# Patient Record
Sex: FEMALE | Race: WHITE | Employment: OTHER | URBAN - METROPOLITAN AREA
[De-identification: names, ages, dates, MRNs, and addresses within clinical notes are randomized per-mention and may not be internally consistent; named-entity substitution may affect disease eponyms.]

---

## 2019-02-19 ENCOUNTER — TRANSFERRED RECORDS (OUTPATIENT)
Dept: HEALTH INFORMATION MANAGEMENT | Facility: CLINIC | Age: 24
End: 2019-02-19

## 2020-10-24 ENCOUNTER — TRANSFERRED RECORDS (OUTPATIENT)
Dept: HEALTH INFORMATION MANAGEMENT | Facility: CLINIC | Age: 25
End: 2020-10-24

## 2021-04-06 ENCOUNTER — HOSPITAL ENCOUNTER (EMERGENCY)
Facility: CLINIC | Age: 26
Discharge: HOME | End: 2021-04-06
Attending: NURSE PRACTITIONER | Admitting: NURSE PRACTITIONER
Payer: MEDICARE

## 2021-04-06 VITALS
DIASTOLIC BLOOD PRESSURE: 60 MMHG | TEMPERATURE: 97.7 F | RESPIRATION RATE: 8 BRPM | SYSTOLIC BLOOD PRESSURE: 110 MMHG | WEIGHT: 110.1 LBS | HEART RATE: 64 BPM | OXYGEN SATURATION: 97 %

## 2021-04-06 DIAGNOSIS — R07.9 CHEST PAIN, UNSPECIFIED TYPE: ICD-10-CM

## 2021-04-06 DIAGNOSIS — R42 LIGHTHEADEDNESS: ICD-10-CM

## 2021-04-06 LAB
ANION GAP SERPL CALCULATED.3IONS-SCNC: 6 MMOL/L (ref 3–14)
BASOPHILS # BLD AUTO: 0 10E9/L (ref 0–0.2)
BASOPHILS NFR BLD AUTO: 0.7 %
BUN SERPL-MCNC: 8 MG/DL (ref 7–30)
CALCIUM SERPL-MCNC: 8.8 MG/DL (ref 8.5–10.1)
CHLORIDE SERPL-SCNC: 104 MMOL/L (ref 94–109)
CO2 SERPL-SCNC: 27 MMOL/L (ref 20–32)
CREAT SERPL-MCNC: 0.66 MG/DL (ref 0.52–1.04)
DIFFERENTIAL METHOD BLD: ABNORMAL
EOSINOPHIL # BLD AUTO: 0.1 10E9/L (ref 0–0.7)
EOSINOPHIL NFR BLD AUTO: 2 %
ERYTHROCYTE [DISTWIDTH] IN BLOOD BY AUTOMATED COUNT: 11.8 % (ref 10–15)
GFR SERPL CREATININE-BSD FRML MDRD: >90 ML/MIN/{1.73_M2}
GLUCOSE SERPL-MCNC: 102 MG/DL (ref 70–99)
HCT VFR BLD AUTO: 34.4 % (ref 35–47)
HGB BLD-MCNC: 12.2 G/DL (ref 11.7–15.7)
IMM GRANULOCYTES # BLD: 0 10E9/L (ref 0–0.4)
IMM GRANULOCYTES NFR BLD: 0.2 %
LYMPHOCYTES # BLD AUTO: 1.1 10E9/L (ref 0.8–5.3)
LYMPHOCYTES NFR BLD AUTO: 27.4 %
MAGNESIUM SERPL-MCNC: 2.2 MG/DL (ref 1.6–2.3)
MCH RBC QN AUTO: 33.8 PG (ref 26.5–33)
MCHC RBC AUTO-ENTMCNC: 35.5 G/DL (ref 31.5–36.5)
MCV RBC AUTO: 95 FL (ref 78–100)
MONOCYTES # BLD AUTO: 0.5 10E9/L (ref 0–1.3)
MONOCYTES NFR BLD AUTO: 12.9 %
NEUTROPHILS # BLD AUTO: 2.3 10E9/L (ref 1.6–8.3)
NEUTROPHILS NFR BLD AUTO: 56.8 %
NRBC # BLD AUTO: 0 10*3/UL
NRBC BLD AUTO-RTO: 0 /100
PHOSPHATE SERPL-MCNC: 3.6 MG/DL (ref 2.5–4.5)
PLATELET # BLD AUTO: 197 10E9/L (ref 150–450)
POTASSIUM SERPL-SCNC: 3.7 MMOL/L (ref 3.4–5.3)
RBC # BLD AUTO: 3.61 10E12/L (ref 3.8–5.2)
SODIUM SERPL-SCNC: 137 MMOL/L (ref 133–144)
TROPONIN I SERPL-MCNC: <0.015 UG/L (ref 0–0.04)
WBC # BLD AUTO: 4 10E9/L (ref 4–11)

## 2021-04-06 PROCEDURE — 93010 ELECTROCARDIOGRAM REPORT: CPT | Performed by: EMERGENCY MEDICINE

## 2021-04-06 PROCEDURE — 85025 COMPLETE CBC W/AUTO DIFF WBC: CPT | Performed by: EMERGENCY MEDICINE

## 2021-04-06 PROCEDURE — 80048 BASIC METABOLIC PNL TOTAL CA: CPT | Performed by: EMERGENCY MEDICINE

## 2021-04-06 PROCEDURE — 84484 ASSAY OF TROPONIN QUANT: CPT | Performed by: EMERGENCY MEDICINE

## 2021-04-06 PROCEDURE — 250N000013 HC RX MED GY IP 250 OP 250 PS 637: Performed by: EMERGENCY MEDICINE

## 2021-04-06 PROCEDURE — 83735 ASSAY OF MAGNESIUM: CPT | Performed by: EMERGENCY MEDICINE

## 2021-04-06 PROCEDURE — 99284 EMERGENCY DEPT VISIT MOD MDM: CPT | Performed by: EMERGENCY MEDICINE

## 2021-04-06 PROCEDURE — 93005 ELECTROCARDIOGRAM TRACING: CPT | Performed by: EMERGENCY MEDICINE

## 2021-04-06 PROCEDURE — 99284 EMERGENCY DEPT VISIT MOD MDM: CPT | Mod: 25 | Performed by: EMERGENCY MEDICINE

## 2021-04-06 PROCEDURE — 84100 ASSAY OF PHOSPHORUS: CPT | Performed by: EMERGENCY MEDICINE

## 2021-04-06 RX ORDER — CLONAZEPAM 0.5 MG/1
2 TABLET ORAL ONCE
Status: COMPLETED | OUTPATIENT
Start: 2021-04-06 | End: 2021-04-06

## 2021-04-06 RX ORDER — SACCHAROMYCES BOULARDII 250 MG
250 CAPSULE ORAL EVERY MORNING
COMMUNITY

## 2021-04-06 RX ORDER — POTASSIUM CHLORIDE 750 MG/1
10 CAPSULE, EXTENDED RELEASE ORAL AT BEDTIME
COMMUNITY
End: 2021-06-14

## 2021-04-06 RX ORDER — GABAPENTIN 600 MG/1
1200 TABLET ORAL AT BEDTIME
COMMUNITY

## 2021-04-06 RX ORDER — LACTOBACILLUS RHAMNOSUS GG 10B CELL
1 CAPSULE ORAL AT BEDTIME
COMMUNITY

## 2021-04-06 RX ORDER — CLONAZEPAM 1 MG/1
1 TABLET ORAL 2 TIMES DAILY PRN
COMMUNITY
End: 2021-06-14

## 2021-04-06 RX ORDER — GABAPENTIN 600 MG/1
600 TABLET ORAL 2 TIMES DAILY
COMMUNITY

## 2021-04-06 RX ORDER — LISDEXAMFETAMINE DIMESYLATE 40 MG/1
40 CAPSULE ORAL EVERY MORNING
COMMUNITY
End: 2021-06-14

## 2021-04-06 RX ORDER — MULTIVIT,TX WITH IRON,MINERALS
500 TABLET, EXTENDED RELEASE ORAL AT BEDTIME
COMMUNITY

## 2021-04-06 RX ORDER — PROPRANOLOL HYDROCHLORIDE 10 MG/1
10 TABLET ORAL 3 TIMES DAILY
COMMUNITY
End: 2021-06-14

## 2021-04-06 RX ORDER — PALIPERIDONE 3 MG/1
3 TABLET, EXTENDED RELEASE ORAL AT BEDTIME
COMMUNITY
End: 2021-06-14

## 2021-04-06 RX ADMIN — CLONAZEPAM 2 MG: 0.5 TABLET ORAL at 13:10

## 2021-04-06 NOTE — ED PROVIDER NOTES
History     Chief Complaint   Patient presents with     Chest Pain     HPI  Mellissa Mittal is a 25 year old female who presents to the emergency department secondary to not feeling well.  She has had some heart palpitations, lightheadedness, intermittent dyspnea.  She has a history of eating disorder, bulimia.  She has had problems with hypokalemia in the past secondary to her eating disorder.  Her eating disorder is currently under control.  Over the last year she stopped binging and purging.  She has had some residual GI issues with diarrhea occasional vomiting and GERD but she no longer self induces vomiting.  Each of her symptoms she is having now are acute on chronic.  Is just been worse over the last few days so she wondered if she had an electrolyte abnormality of some sort.  She does take potassium and magnesium supplementation.  She has not been persistently vomiting.  She vomited once yesterday.  She takes an antacid as well.  No new swelling in the legs.  No dysuria or hematuria.  No fever cough.    Allergies:  Allergies   Allergen Reactions     Tylenol [Acetaminophen] GI Disturbance       Problem List:    There are no active problems to display for this patient.       Past Medical History:    No past medical history on file.    Past Surgical History:    No past surgical history on file.    Family History:    No family history on file.    Social History:  Marital Status:    Social History     Tobacco Use     Smoking status: Not on file   Substance Use Topics     Alcohol use: Not on file     Drug use: Not on file        Medications:    clonazePAM (KLONOPIN) 1 MG tablet  esomeprazole (NEXIUM) 20 MG DR capsule  gabapentin (NEURONTIN) 600 MG tablet  gabapentin (NEURONTIN) 600 MG tablet  lactobacillus rhamnosus, GG, (CULTURELL) capsule  lisdexamfetamine (VYVANSE) 40 MG capsule  magnesium gluconate (MAGONATE) 250 MG tablet  paliperidone ER (INVEGA) 3 MG 24 hr tablet  potassium chloride ER (MICRO-K) 10  MEQ CR capsule  propranolol (INDERAL) 10 MG tablet  saccharomyces boulardii (FLORASTOR) 250 MG capsule  vitamin D3 (CHOLECALCIFEROL) 250 mcg (16965 units) capsule          Review of Systems   All other systems reviewed and are negative.      Physical Exam   BP: 121/81  Pulse: 64  Temp: 97.7  F (36.5  C)  Resp: 14  Weight: 49.9 kg (110 lb 1.6 oz)  SpO2: 98 %      Physical Exam  Vitals signs and nursing note reviewed.   Constitutional:       General: She is not in acute distress.     Appearance: She is well-developed. She is not diaphoretic.   HENT:      Head: Normocephalic and atraumatic.   Eyes:      General: No scleral icterus.  Neck:      Musculoskeletal: Normal range of motion and neck supple.   Cardiovascular:      Rate and Rhythm: Normal rate and regular rhythm.      Heart sounds: Normal heart sounds. Heart sounds not distant. No murmur. No systolic murmur. No diastolic murmur.   Pulmonary:      Effort: Pulmonary effort is normal. No accessory muscle usage or respiratory distress.      Breath sounds: Normal breath sounds. No decreased breath sounds, wheezing or rhonchi.   Musculoskeletal: Normal range of motion.      Right lower leg: No edema.   Skin:     General: Skin is warm and dry.      Coloration: Skin is not pale.      Findings: No erythema or rash.      Comments: Old scars on her arm that appear to be from previous self-induced cutting.   Neurological:      Mental Status: She is alert and oriented to person, place, and time.         ED Course        Procedures               EKG Interpretation:      Interpreted by Dirk Souza MD  Time reviewed: 1146  Symptoms at time of EKG: Chest pain  Rhythm: normal sinus   Rate: normal  Axis: normal  Ectopy: none  Conduction: normal  ST Segments/ T Waves: No ST-T wave changes  Q Waves: none  Comparison to prior: No old EKG available    Clinical Impression: normal EKG                 Results for orders placed or performed during the hospital encounter of 04/06/21  (from the past 24 hour(s))   CBC with platelets differential   Result Value Ref Range    WBC 4.0 4.0 - 11.0 10e9/L    RBC Count 3.61 (L) 3.8 - 5.2 10e12/L    Hemoglobin 12.2 11.7 - 15.7 g/dL    Hematocrit 34.4 (L) 35.0 - 47.0 %    MCV 95 78 - 100 fl    MCH 33.8 (H) 26.5 - 33.0 pg    MCHC 35.5 31.5 - 36.5 g/dL    RDW 11.8 10.0 - 15.0 %    Platelet Count 197 150 - 450 10e9/L    Diff Method Automated Method     % Neutrophils 56.8 %    % Lymphocytes 27.4 %    % Monocytes 12.9 %    % Eosinophils 2.0 %    % Basophils 0.7 %    % Immature Granulocytes 0.2 %    Nucleated RBCs 0 0 /100    Absolute Neutrophil 2.3 1.6 - 8.3 10e9/L    Absolute Lymphocytes 1.1 0.8 - 5.3 10e9/L    Absolute Monocytes 0.5 0.0 - 1.3 10e9/L    Absolute Eosinophils 0.1 0.0 - 0.7 10e9/L    Absolute Basophils 0.0 0.0 - 0.2 10e9/L    Abs Immature Granulocytes 0.0 0 - 0.4 10e9/L    Absolute Nucleated RBC 0.0    Basic metabolic panel   Result Value Ref Range    Sodium 137 133 - 144 mmol/L    Potassium 3.7 3.4 - 5.3 mmol/L    Chloride 104 94 - 109 mmol/L    Carbon Dioxide 27 20 - 32 mmol/L    Anion Gap 6 3 - 14 mmol/L    Glucose 102 (H) 70 - 99 mg/dL    Urea Nitrogen 8 7 - 30 mg/dL    Creatinine 0.66 0.52 - 1.04 mg/dL    GFR Estimate >90 >60 mL/min/[1.73_m2]    GFR Estimate If Black >90 >60 mL/min/[1.73_m2]    Calcium 8.8 8.5 - 10.1 mg/dL   Troponin I   Result Value Ref Range    Troponin I ES <0.015 0.000 - 0.045 ug/L   Magnesium   Result Value Ref Range    Magnesium 2.2 1.6 - 2.3 mg/dL   Phosphorus   Result Value Ref Range    Phosphorus 3.6 2.5 - 4.5 mg/dL       Medications   clonazePAM (klonoPIN) tablet 2 mg (2 mg Oral Given 4/6/21 8347)       Assessments & Plan (with Medical Decision Making)  25-year-old female with a history of bulimia presented with nonspecific symptoms of feeling lightheaded and intermittent chest discomfort.  She appeared somewhat anxious during the visit and we discussed the option to treat her anxiety with her clonazepam which she  wanted to do.  She improved and felt ready to go home.  Labs and EKG are reassuring.  No persistent shortness of breath or cough therefore chest x-ray not performed.  The differential diagnosis, treatment options, risks and follow-up discussed with a competent patient who agrees with the plan.     I have reviewed the nursing notes.    I have reviewed the findings, diagnosis, plan and need for follow up with the patient.      New Prescriptions    No medications on file       Final diagnoses:   Lightheadedness   Chest pain, unspecified type       4/6/2021   [facility] EMERGENCY DEPT     Dirk Souza MD  04/06/21 3235

## 2021-04-06 NOTE — ED TRIAGE NOTES
Pt presents with chest pain and heaviness. Pt with history of eating disorder. Pt states see's therapist for this. Pt is from [place] and now living with mom. Pt states K + is probably low.

## 2021-04-06 NOTE — DISCHARGE INSTRUCTIONS
As discussed I am not sure as to the cause of your lightheadedness and chest discomfort but your labs and EKG are reassuring.  Your phosphorus and magnesium were also normal.  I hope you continue to improve.  Please return to the emergency department if you develop new or worsening symptoms.  It was a pleasure to meet you today.

## 2021-06-14 ENCOUNTER — HOSPITAL ENCOUNTER (EMERGENCY)
Facility: CLINIC | Age: 26
Discharge: HOME | End: 2021-06-14
Attending: EMERGENCY MEDICINE | Admitting: EMERGENCY MEDICINE
Payer: MEDICARE

## 2021-06-14 VITALS
HEART RATE: 68 BPM | TEMPERATURE: 97.7 F | WEIGHT: 111 LBS | OXYGEN SATURATION: 98 % | SYSTOLIC BLOOD PRESSURE: 112 MMHG | DIASTOLIC BLOOD PRESSURE: 78 MMHG | RESPIRATION RATE: 16 BRPM

## 2021-06-14 DIAGNOSIS — N30.01 ACUTE CYSTITIS WITH HEMATURIA: ICD-10-CM

## 2021-06-14 LAB
ALBUMIN SERPL-MCNC: 4.1 G/DL (ref 3.4–5)
ALBUMIN UR-MCNC: NEGATIVE MG/DL
ALP SERPL-CCNC: 65 U/L (ref 40–150)
ALT SERPL W P-5'-P-CCNC: 21 U/L (ref 0–50)
ANION GAP SERPL CALCULATED.3IONS-SCNC: 4 MMOL/L (ref 3–14)
APPEARANCE UR: ABNORMAL
AST SERPL W P-5'-P-CCNC: 21 U/L (ref 0–45)
BACTERIA #/AREA URNS HPF: ABNORMAL /HPF
BASOPHILS # BLD AUTO: 0 10E9/L (ref 0–0.2)
BASOPHILS NFR BLD AUTO: 0.4 %
BILIRUB SERPL-MCNC: 0.6 MG/DL (ref 0.2–1.3)
BILIRUB UR QL STRIP: NEGATIVE
BUN SERPL-MCNC: 3 MG/DL (ref 7–30)
CALCIUM SERPL-MCNC: 8.6 MG/DL (ref 8.5–10.1)
CHLORIDE SERPL-SCNC: 99 MMOL/L (ref 94–109)
CO2 SERPL-SCNC: 29 MMOL/L (ref 20–32)
COLOR UR AUTO: YELLOW
CREAT SERPL-MCNC: 0.71 MG/DL (ref 0.52–1.04)
DIFFERENTIAL METHOD BLD: ABNORMAL
EOSINOPHIL # BLD AUTO: 0.1 10E9/L (ref 0–0.7)
EOSINOPHIL NFR BLD AUTO: 1.1 %
ERYTHROCYTE [DISTWIDTH] IN BLOOD BY AUTOMATED COUNT: 11.6 % (ref 10–15)
GFR SERPL CREATININE-BSD FRML MDRD: >90 ML/MIN/{1.73_M2}
GLUCOSE SERPL-MCNC: 84 MG/DL (ref 70–99)
GLUCOSE UR STRIP-MCNC: NEGATIVE MG/DL
HCT VFR BLD AUTO: 35.6 % (ref 35–47)
HGB BLD-MCNC: 13.1 G/DL (ref 11.7–15.7)
HGB UR QL STRIP: ABNORMAL
IMM GRANULOCYTES # BLD: 0 10E9/L (ref 0–0.4)
IMM GRANULOCYTES NFR BLD: 0.3 %
KETONES UR STRIP-MCNC: NEGATIVE MG/DL
LEUKOCYTE ESTERASE UR QL STRIP: ABNORMAL
LIPASE SERPL-CCNC: 139 U/L (ref 73–393)
LYMPHOCYTES # BLD AUTO: 1 10E9/L (ref 0.8–5.3)
LYMPHOCYTES NFR BLD AUTO: 12.7 %
MCH RBC QN AUTO: 33.9 PG (ref 26.5–33)
MCHC RBC AUTO-ENTMCNC: 36.8 G/DL (ref 31.5–36.5)
MCV RBC AUTO: 92 FL (ref 78–100)
MONOCYTES # BLD AUTO: 0.7 10E9/L (ref 0–1.3)
MONOCYTES NFR BLD AUTO: 9.6 %
NEUTROPHILS # BLD AUTO: 5.7 10E9/L (ref 1.6–8.3)
NEUTROPHILS NFR BLD AUTO: 75.9 %
NITRATE UR QL: NEGATIVE
NRBC # BLD AUTO: 0 10*3/UL
NRBC BLD AUTO-RTO: 0 /100
PH UR STRIP: 7 PH (ref 5–7)
PLATELET # BLD AUTO: 179 10E9/L (ref 150–450)
POTASSIUM SERPL-SCNC: 3.9 MMOL/L (ref 3.4–5.3)
PROT SERPL-MCNC: 7.5 G/DL (ref 6.8–8.8)
RBC # BLD AUTO: 3.86 10E12/L (ref 3.8–5.2)
RBC #/AREA URNS AUTO: 3 /HPF (ref 0–2)
SODIUM SERPL-SCNC: 132 MMOL/L (ref 133–144)
SOURCE: ABNORMAL
SP GR UR STRIP: 1 (ref 1–1.03)
SQUAMOUS #/AREA URNS AUTO: 2 /HPF (ref 0–1)
UROBILINOGEN UR STRIP-MCNC: 0 MG/DL (ref 0–2)
WBC # BLD AUTO: 7.5 10E9/L (ref 4–11)
WBC #/AREA URNS AUTO: 62 /HPF (ref 0–5)

## 2021-06-14 PROCEDURE — 99284 EMERGENCY DEPT VISIT MOD MDM: CPT | Mod: 25 | Performed by: EMERGENCY MEDICINE

## 2021-06-14 PROCEDURE — 80053 COMPREHEN METABOLIC PANEL: CPT | Performed by: EMERGENCY MEDICINE

## 2021-06-14 PROCEDURE — 87088 URINE BACTERIA CULTURE: CPT | Mod: 59 | Performed by: EMERGENCY MEDICINE

## 2021-06-14 PROCEDURE — 250N000011 HC RX IP 250 OP 636: Performed by: EMERGENCY MEDICINE

## 2021-06-14 PROCEDURE — 258N000003 HC RX IP 258 OP 636: Performed by: EMERGENCY MEDICINE

## 2021-06-14 PROCEDURE — 87186 SC STD MICRODIL/AGAR DIL: CPT | Mod: 59 | Performed by: EMERGENCY MEDICINE

## 2021-06-14 PROCEDURE — 96361 HYDRATE IV INFUSION ADD-ON: CPT | Performed by: EMERGENCY MEDICINE

## 2021-06-14 PROCEDURE — 99284 EMERGENCY DEPT VISIT MOD MDM: CPT | Performed by: EMERGENCY MEDICINE

## 2021-06-14 PROCEDURE — 81001 URINALYSIS AUTO W/SCOPE: CPT | Performed by: EMERGENCY MEDICINE

## 2021-06-14 PROCEDURE — 96374 THER/PROPH/DIAG INJ IV PUSH: CPT | Performed by: EMERGENCY MEDICINE

## 2021-06-14 PROCEDURE — 83690 ASSAY OF LIPASE: CPT | Performed by: EMERGENCY MEDICINE

## 2021-06-14 PROCEDURE — 85025 COMPLETE CBC W/AUTO DIFF WBC: CPT | Performed by: EMERGENCY MEDICINE

## 2021-06-14 PROCEDURE — 87086 URINE CULTURE/COLONY COUNT: CPT | Performed by: EMERGENCY MEDICINE

## 2021-06-14 RX ORDER — MAGNESIUM GLUCONATE 27 MG(500)
250 TABLET ORAL DAILY
COMMUNITY
End: 2021-06-14

## 2021-06-14 RX ORDER — IBUPROFEN 200 MG
600 TABLET ORAL EVERY 6 HOURS PRN
COMMUNITY

## 2021-06-14 RX ORDER — POTASSIUM CHLORIDE 750 MG/1
10 TABLET, EXTENDED RELEASE ORAL DAILY
COMMUNITY
Start: 2021-05-21

## 2021-06-14 RX ORDER — NITROFURANTOIN 25; 75 MG/1; MG/1
100 CAPSULE ORAL 2 TIMES DAILY
Qty: 14 CAPSULE | Refills: 0 | Status: SHIPPED | OUTPATIENT
Start: 2021-06-14

## 2021-06-14 RX ORDER — SODIUM CHLORIDE 9 MG/ML
INJECTION, SOLUTION INTRAVENOUS CONTINUOUS
Status: DISCONTINUED | OUTPATIENT
Start: 2021-06-14 | End: 2021-06-14 | Stop reason: HOSPADM

## 2021-06-14 RX ORDER — HYDROXYZINE PAMOATE 50 MG/1
50 CAPSULE ORAL 3 TIMES DAILY PRN
COMMUNITY
Start: 2021-05-21

## 2021-06-14 RX ORDER — PHENAZOPYRIDINE HYDROCHLORIDE 200 MG/1
200 TABLET, FILM COATED ORAL 3 TIMES DAILY PRN
Qty: 9 TABLET | Refills: 0 | Status: SHIPPED | OUTPATIENT
Start: 2021-06-14 | End: 2021-06-17

## 2021-06-14 RX ORDER — PALIPERIDONE 3 MG/1
3 TABLET, EXTENDED RELEASE ORAL DAILY
COMMUNITY
Start: 2021-05-21

## 2021-06-14 RX ORDER — ONDANSETRON 2 MG/ML
4 INJECTION INTRAMUSCULAR; INTRAVENOUS EVERY 30 MIN PRN
Status: DISCONTINUED | OUTPATIENT
Start: 2021-06-14 | End: 2021-06-14 | Stop reason: HOSPADM

## 2021-06-14 RX ORDER — PROPRANOLOL HYDROCHLORIDE 10 MG/1
10 TABLET ORAL 3 TIMES DAILY
COMMUNITY
Start: 2021-05-21

## 2021-06-14 RX ORDER — FLUCONAZOLE 150 MG/1
TABLET ORAL
Qty: 2 TABLET | Refills: 0 | Status: SHIPPED | OUTPATIENT
Start: 2021-06-14 | End: 2021-06-17

## 2021-06-14 RX ORDER — FLUOXETINE 10 MG/1
10 CAPSULE ORAL EVERY MORNING
COMMUNITY
Start: 2021-05-22

## 2021-06-14 RX ORDER — LISDEXAMFETAMINE DIMESYLATE 40 MG/1
40 CAPSULE ORAL EVERY MORNING
COMMUNITY
Start: 2021-05-21

## 2021-06-14 RX ORDER — SIMETHICONE 80 MG
80-160 TABLET,CHEWABLE ORAL 4 TIMES DAILY PRN
COMMUNITY
Start: 2021-05-21

## 2021-06-14 RX ORDER — CLONAZEPAM 2 MG/1
2 TABLET ORAL DAILY PRN
COMMUNITY
Start: 2021-05-21

## 2021-06-14 RX ADMIN — ONDANSETRON 4 MG: 2 INJECTION INTRAMUSCULAR; INTRAVENOUS at 15:47

## 2021-06-14 RX ADMIN — SODIUM CHLORIDE 1000 ML: 9 INJECTION, SOLUTION INTRAVENOUS at 15:47
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
## 2021-06-14 NOTE — ED TRIAGE NOTES
"Pt reports she is having blood in her urine and her left eye has redness and yellowing, she reports she has been drinking a lot of alcohol lately and she thinks \"my body finally hates me\"  "

## 2021-06-15 NOTE — RESULT ENCOUNTER NOTE
[facility] Emergency Dept discharge antibiotic (if prescribed): Nitrofurantoin Macrocrystal-Monohydrate (Macrobid) 100 mg PO capsule, 1 capsule (100 mg) by mouth 2 times daily for 7 days  Date of Rx (if applicable):  6/14/21  No changes in treatment per [facility] ED Lab Result Urine culture protocol.

## 2021-06-17 NOTE — RESULT ENCOUNTER NOTE
Final Urine Culture Report on 6/17/21  Emergency Dep/Urgent Care discharge antibiotic prescribed: Nitrofurantoin Macrocrystal-Monohydrate (Macrobid) 100 mg PO capsule, 1 capsule (100 mg) by mouth 2 times daily for 7 days  #1. Bacteria, 50,000 to 100,000 colonies/ml Escherichia coli, is SUSCEPTIBLE to Antibiotic.    #2. Bacteria, 10,000 to 50,000 colonies/ml Strain 2 Escherichia coli, is SUSCEPTIBLE to Antibiotic.    As per [facility] ED Lab Result Urine culture protocol, no change in antibiotic therapy.

## 2021-06-18 LAB
BACTERIA SPEC CULT: ABNORMAL
BACTERIA SPEC CULT: ABNORMAL
Lab: ABNORMAL
SPECIMEN SOURCE: ABNORMAL

## 2021-07-18 ENCOUNTER — HOSPITAL ENCOUNTER (EMERGENCY)
Facility: CLINIC | Age: 26
Discharge: HOME | End: 2021-07-19
Attending: FAMILY MEDICINE | Admitting: FAMILY MEDICINE
Payer: MEDICARE

## 2021-07-18 DIAGNOSIS — T50.902A INTENTIONAL DRUG OVERDOSE, INITIAL ENCOUNTER (H): ICD-10-CM

## 2021-07-18 LAB
ALBUMIN UR-MCNC: NEGATIVE MG/DL
AMPHETAMINES UR QL: DETECTED
APPEARANCE UR: CLEAR
BARBITURATES UR QL SCN: NOT DETECTED
BASOPHILS # BLD AUTO: 0 10E3/UL (ref 0–0.2)
BASOPHILS NFR BLD AUTO: 1 %
BENZODIAZ UR QL SCN: NOT DETECTED
BILIRUB UR QL STRIP: NEGATIVE
BUPRENORPHINE UR QL: NOT DETECTED
CANNABINOIDS UR QL: NOT DETECTED
COCAINE UR QL SCN: NOT DETECTED
COLOR UR AUTO: ABNORMAL
D-METHAMPHET UR QL: NOT DETECTED
EOSINOPHIL # BLD AUTO: 0.1 10E3/UL (ref 0–0.7)
EOSINOPHIL NFR BLD AUTO: 2 %
ERYTHROCYTE [DISTWIDTH] IN BLOOD BY AUTOMATED COUNT: 11.9 % (ref 10–15)
GLUCOSE UR STRIP-MCNC: NEGATIVE MG/DL
HCG UR QL: NEGATIVE
HCT VFR BLD AUTO: 33.6 % (ref 35–47)
HGB BLD-MCNC: 12 G/DL (ref 11.7–15.7)
HGB UR QL STRIP: NEGATIVE
IMM GRANULOCYTES # BLD: 0 10E3/UL
IMM GRANULOCYTES NFR BLD: 0 %
KETONES UR STRIP-MCNC: NEGATIVE MG/DL
LACTATE SERPL-SCNC: 1.5 MMOL/L (ref 0.7–2)
LEUKOCYTE ESTERASE UR QL STRIP: NEGATIVE
LYMPHOCYTES # BLD AUTO: 1.5 10E3/UL (ref 0.8–5.3)
LYMPHOCYTES NFR BLD AUTO: 29 %
MAGNESIUM SERPL-MCNC: 2.2 MG/DL (ref 1.6–2.3)
MCH RBC QN AUTO: 33.7 PG (ref 26.5–33)
MCHC RBC AUTO-ENTMCNC: 35.7 G/DL (ref 31.5–36.5)
MCV RBC AUTO: 94 FL (ref 78–100)
METHADONE UR QL SCN: NOT DETECTED
MONOCYTES # BLD AUTO: 0.9 10E3/UL (ref 0–1.3)
MONOCYTES NFR BLD AUTO: 18 %
NEUTROPHILS # BLD AUTO: 2.6 10E3/UL (ref 1.6–8.3)
NEUTROPHILS NFR BLD AUTO: 50 %
NITRATE UR QL: NEGATIVE
NRBC # BLD AUTO: 0 10E3/UL
NRBC BLD AUTO-RTO: 0 /100
OPIATES UR QL SCN: NOT DETECTED
OXYCODONE UR QL SCN: NOT DETECTED
PCP UR QL SCN: NOT DETECTED
PH UR STRIP: 8 [PH] (ref 5–7)
PLATELET # BLD AUTO: 183 10E3/UL (ref 150–450)
PROPOXYPH UR QL: NOT DETECTED
RBC # BLD AUTO: 3.56 10E6/UL (ref 3.8–5.2)
RBC URINE: 0 /HPF
SALICYLATES SERPL-MCNC: <2 MG/DL
SP GR UR STRIP: 1 (ref 1–1.03)
SQUAMOUS EPITHELIAL: <1 /HPF
TRICYCLICS UR QL SCN: NOT DETECTED
UROBILINOGEN UR STRIP-MCNC: NORMAL MG/DL
WBC # BLD AUTO: 5.1 10E3/UL (ref 4–11)
WBC URINE: <1 /HPF

## 2021-07-18 PROCEDURE — 83605 ASSAY OF LACTIC ACID: CPT | Performed by: FAMILY MEDICINE

## 2021-07-18 PROCEDURE — 90791 PSYCH DIAGNOSTIC EVALUATION: CPT

## 2021-07-18 PROCEDURE — 81025 URINE PREGNANCY TEST: CPT | Performed by: FAMILY MEDICINE

## 2021-07-18 PROCEDURE — 85025 COMPLETE CBC W/AUTO DIFF WBC: CPT | Performed by: FAMILY MEDICINE

## 2021-07-18 PROCEDURE — 83735 ASSAY OF MAGNESIUM: CPT | Performed by: FAMILY MEDICINE

## 2021-07-18 PROCEDURE — 36415 COLL VENOUS BLD VENIPUNCTURE: CPT | Performed by: FAMILY MEDICINE

## 2021-07-18 PROCEDURE — 93005 ELECTROCARDIOGRAM TRACING: CPT | Performed by: FAMILY MEDICINE

## 2021-07-18 PROCEDURE — 81001 URINALYSIS AUTO W/SCOPE: CPT | Mod: XU | Performed by: FAMILY MEDICINE

## 2021-07-18 PROCEDURE — 80179 DRUG ASSAY SALICYLATE: CPT | Performed by: FAMILY MEDICINE

## 2021-07-18 PROCEDURE — 36592 COLLECT BLOOD FROM PICC: CPT | Performed by: FAMILY MEDICINE

## 2021-07-18 PROCEDURE — 93010 ELECTROCARDIOGRAM REPORT: CPT | Performed by: FAMILY MEDICINE

## 2021-07-18 PROCEDURE — 96360 HYDRATION IV INFUSION INIT: CPT | Performed by: FAMILY MEDICINE

## 2021-07-18 PROCEDURE — 99285 EMERGENCY DEPT VISIT HI MDM: CPT | Mod: 25 | Performed by: FAMILY MEDICINE

## 2021-07-18 PROCEDURE — 82077 ASSAY SPEC XCP UR&BREATH IA: CPT | Performed by: FAMILY MEDICINE

## 2021-07-18 PROCEDURE — 82040 ASSAY OF SERUM ALBUMIN: CPT | Performed by: FAMILY MEDICINE

## 2021-07-18 PROCEDURE — 80143 DRUG ASSAY ACETAMINOPHEN: CPT | Performed by: FAMILY MEDICINE

## 2021-07-18 PROCEDURE — 80306 DRUG TEST PRSMV INSTRMNT: CPT | Performed by: FAMILY MEDICINE

## 2021-07-18 ASSESSMENT — MIFFLIN-ST. JEOR: SCORE: 1164.53

## 2021-07-19 VITALS
BODY MASS INDEX: 19.32 KG/M2 | HEART RATE: 77 BPM | WEIGHT: 105 LBS | SYSTOLIC BLOOD PRESSURE: 95 MMHG | TEMPERATURE: 98.1 F | DIASTOLIC BLOOD PRESSURE: 54 MMHG | RESPIRATION RATE: 16 BRPM | HEIGHT: 62 IN | OXYGEN SATURATION: 97 %

## 2021-07-19 LAB
ALBUMIN SERPL-MCNC: 4 G/DL (ref 3.4–5)
ALP SERPL-CCNC: 58 U/L (ref 40–150)
ALT SERPL W P-5'-P-CCNC: 31 U/L (ref 0–50)
ANION GAP SERPL CALCULATED.3IONS-SCNC: 6 MMOL/L (ref 3–14)
APAP SERPL-MCNC: <2 MG/L (ref 10–30)
AST SERPL W P-5'-P-CCNC: 39 U/L (ref 0–45)
BILIRUB SERPL-MCNC: 0.3 MG/DL (ref 0.2–1.3)
BUN SERPL-MCNC: 5 MG/DL (ref 7–30)
CALCIUM SERPL-MCNC: 8.4 MG/DL (ref 8.5–10.1)
CHLORIDE BLD-SCNC: 110 MMOL/L (ref 94–109)
CO2 SERPL-SCNC: 28 MMOL/L (ref 20–32)
CREAT SERPL-MCNC: 0.85 MG/DL (ref 0.52–1.04)
ETHANOL SERPL-MCNC: 0.12 G/DL
GFR SERPL CREATININE-BSD FRML MDRD: >90 ML/MIN/1.73M2
GLUCOSE BLD-MCNC: 87 MG/DL (ref 70–99)
HOLD SPECIMEN: NORMAL
HOLD SPECIMEN: NORMAL
POTASSIUM BLD-SCNC: 3.1 MMOL/L (ref 3.4–5.3)
PROT SERPL-MCNC: 7.1 G/DL (ref 6.8–8.8)
SODIUM SERPL-SCNC: 144 MMOL/L (ref 133–144)

## 2021-07-19 PROCEDURE — 258N000003 HC RX IP 258 OP 636: Performed by: FAMILY MEDICINE

## 2021-07-19 RX ADMIN — SODIUM CHLORIDE 1000 ML: 9 INJECTION, SOLUTION INTRAVENOUS at 00:38
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
## 2021-07-19 NOTE — ED NOTES
Verbal order from provider to stop watch at this time.  Plan to wake pt at 0500 and she will call her mother for a ride home.  DEC will be sending a safety contract.

## 2021-07-19 NOTE — DISCHARGE INSTRUCTIONS
Fortunately, there were no serious or long-term consequences of your overdose.  Please follow your safety plan as agreed  Follow-up for your scheduled medication management appointment and with your counseling appointment.  Utilize the tools that you have to prevent this from happening again.  Return to the emergency department at any time if you do not feel safe.

## 2021-07-19 NOTE — ED NOTES
RN contacted poison control. Spoke with Michael. Monitor for CNS depression, QT prolongation, and transient hypotension. Magnesium labs should be ordered.
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
## 2021-08-12 ENCOUNTER — HOSPITAL ENCOUNTER (EMERGENCY)
Facility: CLINIC | Age: 26
Discharge: HOME | End: 2021-08-12
Attending: EMERGENCY MEDICINE | Admitting: EMERGENCY MEDICINE
Payer: MEDICARE

## 2021-08-12 VITALS
WEIGHT: 105 LBS | HEART RATE: 90 BPM | DIASTOLIC BLOOD PRESSURE: 73 MMHG | TEMPERATURE: 98 F | OXYGEN SATURATION: 97 % | SYSTOLIC BLOOD PRESSURE: 117 MMHG | RESPIRATION RATE: 16 BRPM | BODY MASS INDEX: 19.2 KG/M2

## 2021-08-12 DIAGNOSIS — L03.114 CELLULITIS OF LEFT UPPER EXTREMITY: ICD-10-CM

## 2021-08-12 DIAGNOSIS — Z72.89 DELIBERATE SELF-CUTTING: ICD-10-CM

## 2021-08-12 LAB
ANION GAP SERPL CALCULATED.3IONS-SCNC: 5 MMOL/L (ref 3–14)
BASOPHILS # BLD AUTO: 0 10E3/UL (ref 0–0.2)
BASOPHILS NFR BLD AUTO: 1 %
BUN SERPL-MCNC: 4 MG/DL (ref 7–30)
CALCIUM SERPL-MCNC: 8.9 MG/DL (ref 8.5–10.1)
CHLORIDE BLD-SCNC: 104 MMOL/L (ref 94–109)
CO2 SERPL-SCNC: 28 MMOL/L (ref 20–32)
CREAT SERPL-MCNC: 0.67 MG/DL (ref 0.52–1.04)
CRP SERPL-MCNC: <2.9 MG/L (ref 0–8)
EOSINOPHIL # BLD AUTO: 0.1 10E3/UL (ref 0–0.7)
EOSINOPHIL NFR BLD AUTO: 2 %
ERYTHROCYTE [DISTWIDTH] IN BLOOD BY AUTOMATED COUNT: 12.2 % (ref 10–15)
GFR SERPL CREATININE-BSD FRML MDRD: >90 ML/MIN/1.73M2
GLUCOSE BLD-MCNC: 116 MG/DL (ref 70–99)
HCT VFR BLD AUTO: 35.8 % (ref 35–47)
HGB BLD-MCNC: 12.4 G/DL (ref 11.7–15.7)
IMM GRANULOCYTES # BLD: 0 10E3/UL
IMM GRANULOCYTES NFR BLD: 0 %
LYMPHOCYTES # BLD AUTO: 1 10E3/UL (ref 0.8–5.3)
LYMPHOCYTES NFR BLD AUTO: 23 %
MCH RBC QN AUTO: 33.7 PG (ref 26.5–33)
MCHC RBC AUTO-ENTMCNC: 34.6 G/DL (ref 31.5–36.5)
MCV RBC AUTO: 97 FL (ref 78–100)
MONOCYTES # BLD AUTO: 0.6 10E3/UL (ref 0–1.3)
MONOCYTES NFR BLD AUTO: 13 %
NEUTROPHILS # BLD AUTO: 2.7 10E3/UL (ref 1.6–8.3)
NEUTROPHILS NFR BLD AUTO: 61 %
NRBC # BLD AUTO: 0 10E3/UL
NRBC BLD AUTO-RTO: 0 /100
PLATELET # BLD AUTO: 183 10E3/UL (ref 150–450)
POTASSIUM BLD-SCNC: 3.5 MMOL/L (ref 3.4–5.3)
RBC # BLD AUTO: 3.68 10E6/UL (ref 3.8–5.2)
SODIUM SERPL-SCNC: 137 MMOL/L (ref 133–144)
WBC # BLD AUTO: 4.3 10E3/UL (ref 4–11)

## 2021-08-12 PROCEDURE — 250N000013 HC RX MED GY IP 250 OP 250 PS 637: Performed by: EMERGENCY MEDICINE

## 2021-08-12 PROCEDURE — 99284 EMERGENCY DEPT VISIT MOD MDM: CPT | Performed by: EMERGENCY MEDICINE

## 2021-08-12 PROCEDURE — 250N000011 HC RX IP 250 OP 636: Performed by: EMERGENCY MEDICINE

## 2021-08-12 PROCEDURE — 80048 BASIC METABOLIC PNL TOTAL CA: CPT | Performed by: EMERGENCY MEDICINE

## 2021-08-12 PROCEDURE — 85025 COMPLETE CBC W/AUTO DIFF WBC: CPT | Performed by: EMERGENCY MEDICINE

## 2021-08-12 PROCEDURE — 96372 THER/PROPH/DIAG INJ SC/IM: CPT | Performed by: EMERGENCY MEDICINE

## 2021-08-12 PROCEDURE — 250N000009 HC RX 250: Performed by: EMERGENCY MEDICINE

## 2021-08-12 PROCEDURE — 86140 C-REACTIVE PROTEIN: CPT | Performed by: EMERGENCY MEDICINE

## 2021-08-12 PROCEDURE — 36415 COLL VENOUS BLD VENIPUNCTURE: CPT | Performed by: EMERGENCY MEDICINE

## 2021-08-12 RX ORDER — FLUCONAZOLE 150 MG/1
TABLET ORAL
Qty: 2 TABLET | Refills: 0 | Status: SHIPPED | OUTPATIENT
Start: 2021-08-12 | End: 2021-08-15

## 2021-08-12 RX ORDER — CLONAZEPAM 0.5 MG/1
2 TABLET ORAL ONCE
Status: COMPLETED | OUTPATIENT
Start: 2021-08-12 | End: 2021-08-12

## 2021-08-12 RX ORDER — CEPHALEXIN 500 MG/1
500 CAPSULE ORAL 4 TIMES DAILY
Qty: 28 CAPSULE | Refills: 0 | Status: SHIPPED | OUTPATIENT
Start: 2021-08-12 | End: 2021-08-19

## 2021-08-12 RX ORDER — IBUPROFEN 600 MG/1
600 TABLET, FILM COATED ORAL ONCE
Status: COMPLETED | OUTPATIENT
Start: 2021-08-12 | End: 2021-08-12

## 2021-08-12 RX ADMIN — IBUPROFEN 600 MG: 600 TABLET, FILM COATED ORAL at 14:42

## 2021-08-12 RX ADMIN — LIDOCAINE HYDROCHLORIDE 1 G: 10 INJECTION, SOLUTION EPIDURAL; INFILTRATION; INTRACAUDAL; PERINEURAL at 14:45

## 2021-08-12 RX ADMIN — CLONAZEPAM 2 MG: 0.5 TABLET ORAL at 14:41
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
## 2021-08-12 NOTE — DISCHARGE INSTRUCTIONS
Your lab work looked good, you did not have an elevated white blood cell count or inflammatory marker to indicate more severe infection    Your potassium level was normal at 3.5    You were given a dose of antibiotics today here in the emergency room.  You do not need to start the oral antibiotic until tomorrow.  You will need to take it 4 times a day for 1 week.  You should complete the course even if the redness clears and you begin to feel better    May cleanse the wound gently with soap and water, pat dry.  May apply bacitracin and nonstick bandage or gauze over the top    Contact the [facility] to get established with a primary provider as a new patient and also to follow-up on wound infection.    If you develop a fever, have streaking redness up your arm, pus draining from the wound, or any new or concerning symptoms, do not hesitate to return to the emergency room for evaluation

## 2021-09-02 ENCOUNTER — TELEPHONE (OUTPATIENT)
Dept: PSYCHIATRY | Facility: CLINIC | Age: 26
End: 2021-09-02

## 2021-09-02 NOTE — TELEPHONE ENCOUNTER
Received referral from IZABEL Munzo.    DX:  Bipolar II Disorder current severity: Moderate and with anxious distress.           Panic Attack Specifier           Posttraumatic Stress Disorder with           delayed expression    Call patient to schedule [phone]

## 2021-09-07 ENCOUNTER — HOSPITAL ENCOUNTER (EMERGENCY)
Facility: CLINIC | Age: 26
Discharge: HOME | End: 2021-09-07
Attending: NURSE PRACTITIONER | Admitting: NURSE PRACTITIONER
Payer: MEDICARE

## 2021-09-07 VITALS
BODY MASS INDEX: 19.2 KG/M2 | TEMPERATURE: 98.6 F | DIASTOLIC BLOOD PRESSURE: 60 MMHG | RESPIRATION RATE: 18 BRPM | WEIGHT: 105 LBS | OXYGEN SATURATION: 94 % | SYSTOLIC BLOOD PRESSURE: 129 MMHG | HEART RATE: 116 BPM

## 2021-09-07 DIAGNOSIS — R60.0 SALIVARY GLAND SWELLING: ICD-10-CM

## 2021-09-07 DIAGNOSIS — Z76.89 ENCOUNTER TO ESTABLISH CARE: ICD-10-CM

## 2021-09-07 DIAGNOSIS — N76.0 ACUTE VAGINITIS: ICD-10-CM

## 2021-09-07 LAB
CLUE CELLS: ABNORMAL
TRICHOMONAS, WET PREP: ABNORMAL
WBC'S/HIGH POWER FIELD, WET PREP: ABNORMAL
YEAST, WET PREP: ABNORMAL

## 2021-09-07 PROCEDURE — 87210 SMEAR WET MOUNT SALINE/INK: CPT | Performed by: NURSE PRACTITIONER

## 2021-09-07 PROCEDURE — 99283 EMERGENCY DEPT VISIT LOW MDM: CPT | Performed by: NURSE PRACTITIONER

## 2021-09-07 PROCEDURE — 99282 EMERGENCY DEPT VISIT SF MDM: CPT | Performed by: NURSE PRACTITIONER

## 2021-09-08 NOTE — ED PROVIDER NOTES
"  History     Chief Complaint   Patient presents with     General Visit     HPI  Mellissa Amber Mittal is a 26 year old female with history of bipolar affective disorder, EtOH abuse, PTSD, and depression who presents to the emergency department stating she has \"multiple little complaints\".  Patient concern for a lump on her left side of her lower inner lip.  This is not painful.  It has been there for months.  Complains of tenderness and swelling to her lower jaw which she believes is either a swollen lymph node or her salivary gland.  This started 1 to 2 weeks ago.  Pain increases when she eats food.  No objective fevers.  No significant cough or nasal congestion.      Additionally, patient complains of vaginal discharge and odor.  She was recently on antibiotics for cellulitis to her left arm.  She was given Rx for diflucan with her antibiotics.    No objective fevers.  She has had intermittent episodes of vomiting, which is not new and has been going on for months.  Denies diarrhea or constipation.  States she needs to establish care with a primary care provider.    Allergies:  Allergies   Allergen Reactions     Drug Ingredient [Pseudoephedrine] Other (See Comments)     disasociation     Tylenol [Acetaminophen] GI Disturbance       Problem List:    There are no problems to display for this patient.       Past Medical History:    Past Medical History:   Diagnosis Date     Anxiety      Depressive disorder        Past Surgical History:    No past surgical history on file.    Family History:    No family history on file.    Social History:  Marital Status:  Single [1]  Social History     Tobacco Use     Smoking status: Current Some Day Smoker     Packs/day: 1.00     Smokeless tobacco: Never Used     Tobacco comment: wants to quit   Substance Use Topics     Alcohol use: Yes     Comment: daily, not last night     Drug use: Never     Comment: legally CBD from internet per pt        Medications:    clonazePAM (KLONOPIN) 2 " MG tablet  esomeprazole (NEXIUM) 20 MG DR capsule  FLUoxetine (PROZAC) 10 MG capsule  gabapentin (NEURONTIN) 600 MG tablet  gabapentin (NEURONTIN) 600 MG tablet  hydrOXYzine (VISTARIL) 50 MG capsule  ibuprofen (ADVIL/MOTRIN) 200 MG tablet  lactobacillus rhamnosus, GG, (CULTURELL) capsule  lisdexamfetamine (VYVANSE) 40 MG capsule  magnesium gluconate (MAGONATE) 250 MG tablet  nitroFURantoin macrocrystal-monohydrate (MACROBID) 100 MG capsule  paliperidone ER (INVEGA) 3 MG 24 hr tablet  potassium chloride ER (KLOR-CON M) 10 MEQ CR tablet  propranolol (INDERAL) 10 MG tablet  saccharomyces boulardii (FLORASTOR) 250 MG capsule  simethicone (MYLICON) 80 MG chewable tablet          Review of Systems  As mentioned above in the history present illness. All other systems were reviewed and are negative.    Physical Exam   BP: 129/60  Pulse: 116  Temp: 98.6  F (37  C)  Resp: 18  Weight: 47.6 kg (105 lb)  SpO2: 94 %      Physical Exam  Constitutional:       General: She is not in acute distress.     Appearance: She is well-developed.   HENT:      Head: Normocephalic and atraumatic.      Jaw: There is normal jaw occlusion. Tenderness (tenderness right submental region) present. No trismus or swelling.      Salivary Glands: Right salivary gland is tender. Right salivary gland is not diffusely enlarged.      Comments: Tenderness over the right parotid gland. No swelling or redness to the parotid region.       Right Ear: Tympanic membrane and external ear normal.      Left Ear: Tympanic membrane and external ear normal.      Nose: Nose normal.      Mouth/Throat:      Mouth: Mucous membranes are moist.      Dentition: Normal dentition.      Pharynx: Oropharynx is clear. Uvula midline. No pharyngeal swelling or oropharyngeal exudate.      Comments: Left lower inner lip cyst.   Eyes:      Conjunctiva/sclera: Conjunctivae normal.   Cardiovascular:      Rate and Rhythm: Normal rate and regular rhythm.      Heart sounds: Normal heart  sounds. No murmur heard.     Pulmonary:      Effort: Pulmonary effort is normal. No respiratory distress.      Breath sounds: Normal breath sounds.   Abdominal:      General: Bowel sounds are normal. There is no distension.      Palpations: Abdomen is soft.      Tenderness: There is no abdominal tenderness.   Musculoskeletal:         General: Normal range of motion.   Skin:     General: Skin is warm and dry.      Findings: No rash.   Neurological:      General: No focal deficit present.      Mental Status: She is alert and oriented to person, place, and time.         ED Course        Procedures         Results for orders placed or performed during the hospital encounter of 09/07/21 (from the past 24 hour(s))   Wet prep    Specimen: Vagina; Swab   Result Value Ref Range    Trichomonas Absent Absent    Yeast Absent Absent    Clue Cells Absent Absent    WBCs/high power field 2+ (A) None       Medications - No data to display    Assessments & Plan (with Medical Decision Making)   I suspect patient has swollen submental lymph node vs salivary gland swelling. No overlying skin changes. No fevers. I have low suspicion for lymphadenitis or salivary gland infection. She could have virus. I also considered intermittent salivary gland obstruction given it is associated/worsened with eating.  Additionally patient complains of vaginal discharge with odor. Wet prep is negative.   Plan:  Suck on sour candy for the next couple days.  No evidence of yeast or bacteria on vaginal swab.   Drink plenty of fluids.  Referral placed for you to establish care with a primary care provider. They will call you to set up appointment.  Return to the emergency department for fevers, vomiting-unable to keep fluids down, facial swelling or redness.  I have reviewed the nursing notes.    I have reviewed the findings, diagnosis, plan and need for follow up with the patient.      New Prescriptions    No medications on file       Final diagnoses:    Salivary gland swelling - vs. swollen lymph node.   Acute vaginitis   Encounter to establish care       9/7/2021   [facility] EMERGENCY DEPT     Viktoria, IZABEL Jeronimo CNP  09/07/21 2812

## 2021-09-08 NOTE — DISCHARGE INSTRUCTIONS
Suck on sour candy for the next couple days.  No evidence of yeast or bacteria on vaginal swab.   Drink plenty of fluids.  Referral placed for you to establish care with a primary care provider. They will call you to set up appointment.  Return to the emergency department for fevers, vomiting-unable to keep fluids down, facial swelling or redness.

## 2021-09-08 NOTE — ED TRIAGE NOTES
"Presents to ED with multiple concerns. Has concern for a swollen lymph node or saliva gland. Also concerned for \"tumor on her lip.\" and has been on multiple antibiotics for vaginitis.   "

## 2021-09-25 ENCOUNTER — HOSPITAL ENCOUNTER (EMERGENCY)
Facility: CLINIC | Age: 26
Discharge: HOME | End: 2021-09-26
Attending: EMERGENCY MEDICINE | Admitting: EMERGENCY MEDICINE
Payer: MEDICARE

## 2021-09-25 DIAGNOSIS — S41.112A LACERATION OF LEFT UPPER EXTREMITY, INITIAL ENCOUNTER: ICD-10-CM

## 2021-09-25 DIAGNOSIS — Z72.89 DELIBERATE SELF-CUTTING: ICD-10-CM

## 2021-09-25 DIAGNOSIS — F32.A DEPRESSION, UNSPECIFIED DEPRESSION TYPE: ICD-10-CM

## 2021-09-25 PROCEDURE — 12005 RPR S/N/A/GEN/TRK12.6-20.0CM: CPT | Performed by: EMERGENCY MEDICINE

## 2021-09-25 PROCEDURE — 99285 EMERGENCY DEPT VISIT HI MDM: CPT | Mod: 25 | Performed by: EMERGENCY MEDICINE

## 2021-09-25 PROCEDURE — 12004 RPR S/N/AX/GEN/TRK7.6-12.5CM: CPT | Performed by: EMERGENCY MEDICINE

## 2021-09-25 PROCEDURE — C9803 HOPD COVID-19 SPEC COLLECT: HCPCS | Performed by: EMERGENCY MEDICINE

## 2021-09-25 ASSESSMENT — MIFFLIN-ST. JEOR: SCORE: 1182.21

## 2021-09-26 VITALS
DIASTOLIC BLOOD PRESSURE: 78 MMHG | OXYGEN SATURATION: 99 % | SYSTOLIC BLOOD PRESSURE: 107 MMHG | HEART RATE: 108 BPM | WEIGHT: 110 LBS | RESPIRATION RATE: 18 BRPM | TEMPERATURE: 98.7 F | HEIGHT: 62 IN | BODY MASS INDEX: 20.24 KG/M2

## 2021-09-26 LAB
AMPHETAMINES UR QL: DETECTED
BARBITURATES UR QL SCN: NOT DETECTED
BENZODIAZ UR QL SCN: NOT DETECTED
BUPRENORPHINE UR QL: NOT DETECTED
CANNABINOIDS UR QL: NOT DETECTED
COCAINE UR QL SCN: NOT DETECTED
D-METHAMPHET UR QL: NOT DETECTED
METHADONE UR QL SCN: NOT DETECTED
OPIATES UR QL SCN: NOT DETECTED
OXYCODONE UR QL SCN: NOT DETECTED
PCP UR QL SCN: NOT DETECTED
PROPOXYPH UR QL: NOT DETECTED
SARS-COV-2 RNA RESP QL NAA+PROBE: NEGATIVE
TRICYCLICS UR QL SCN: NOT DETECTED

## 2021-09-26 PROCEDURE — 90715 TDAP VACCINE 7 YRS/> IM: CPT | Performed by: EMERGENCY MEDICINE

## 2021-09-26 PROCEDURE — 90791 PSYCH DIAGNOSTIC EVALUATION: CPT

## 2021-09-26 PROCEDURE — 250N000011 HC RX IP 250 OP 636: Performed by: EMERGENCY MEDICINE

## 2021-09-26 PROCEDURE — C9803 HOPD COVID-19 SPEC COLLECT: HCPCS | Performed by: EMERGENCY MEDICINE

## 2021-09-26 PROCEDURE — 90471 IMMUNIZATION ADMIN: CPT | Performed by: EMERGENCY MEDICINE

## 2021-09-26 PROCEDURE — 80306 DRUG TEST PRSMV INSTRMNT: CPT | Performed by: EMERGENCY MEDICINE

## 2021-09-26 PROCEDURE — 87635 SARS-COV-2 COVID-19 AMP PRB: CPT | Performed by: EMERGENCY MEDICINE

## 2021-09-26 RX ADMIN — CLOSTRIDIUM TETANI TOXOID ANTIGEN (FORMALDEHYDE INACTIVATED), CORYNEBACTERIUM DIPHTHERIAE TOXOID ANTIGEN (FORMALDEHYDE INACTIVATED), BORDETELLA PERTUSSIS TOXOID ANTIGEN (GLUTARALDEHYDE INACTIVATED), BORDETELLA PERTUSSIS FILAMENTOUS HEMAGGLUTININ ANTIGEN (FORMALDEHYDE INACTIVATED), BORDETELLA PERTUSSIS PERTACTIN ANTIGEN, AND BORDETELLA PERTUSSIS FIMBRIAE 2/3 ANTIGEN 0.5 ML: 5; 2; 2.5; 5; 3; 5 INJECTION, SUSPENSION INTRAMUSCULAR at 01:15

## 2021-09-26 NOTE — DISCHARGE INSTRUCTIONS
[facility] (Behavioral Health) will be calling to check in/follow up.  Their number is .  They will leave a message if you're busy. Please feel free to call them if you do decide you want more support.   You can let Jimmy know when you feel unsafe/feeling like self harming.  Being in an environment that feels safer/more affirming is thought to be important at this time.     You agree to talk with Jimmy, attend your appointments on Wednesday and tell someone if feeling unsafe or dysregulated.    If Jimmy is not around or you for some reason cannot reach him, [facility] has a mobile crisis team who want to be there in times just like those.  Their number is .
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
## 2021-09-26 NOTE — ED NOTES
"   ED SIGNOUT Note    CC:      Chief Complaint   Patient presents with     Laceration        Sign-out received from Dr. Anne Marie Can  HPI: Mellissa Mittal is a 26 year old female seen in the emergency department and signed out to me at shift change. Please refer to the ED provider note by Dr. Anne Marie Can.  Patient had a significant left forearm laceration as noted in her exam and accompanying photo.  Patient required 15 stitches to close her 10.5 cm laceration.  Patient was awaiting completion of her behavioral health assessment.      Medical records reviewed     Physical Exam: Please see ED Provider Note  Initial vitals were reviewed  Last vitals: Blood pressure 107/78, pulse 108, temperature 98.7  F (37.1  C), temperature source Oral, resp. rate 18, height 1.575 m (5' 2\"), weight 49.9 kg (110 lb), SpO2 99 %.      IMPRESSION:   Final diagnoses:   Laceration of left upper extremity, initial encounter   Deliberate self-cutting   Depression, unspecified depression type       ED COURSE/MEDICAL DECISION MAKING  Mellissa Mittal is a 26 year old female signed out to me at the change of shift.  Patient completed her behavioral health assessment.  Information was obtained from the patient as well as her friend.  An alternative plan was developed to maintain her safety.  Please see the full behavioral health note and the following discharge instructions.  Patient is medically cleared for discharge.          Discharge instructions:  [facility] (Behavioral Health) will be calling to check in/follow up.  Their number is .  They will leave a message if you're busy. Please feel free to call them if you do decide you want more support.   You can let Jimmy know when you feel unsafe/feeling like self harming.  Being in an environment that feels safer/more affirming is thought to be important at this time.     You agree to talk with Jimmy, attend your appointments on Wednesday and tell someone if feeling " unsafe or dysregulated.    If Jimmy is not around or you for some reason cannot reach him, [facility]) has a mobile crisis team who want to be there in times just like those.  Their number is .         Ruma Yao MD  09/26/21 0624
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
## 2021-09-29 ENCOUNTER — NURSE TRIAGE (OUTPATIENT)
Dept: NURSING | Facility: CLINIC | Age: 26
End: 2021-09-29

## 2021-09-29 NOTE — TELEPHONE ENCOUNTER
"Patient is calling, reporting pains in her legs along right inner thigh which started today. The pains have moved from the inner thigh to the outer thigh.   Denies swelling, redness, fever, leg injuries, severe pain, chest pain, difficulty breathing. . Describes pain as dull, rates the pain 5/10,  States it's an \"uncomfortable pain, but not severe\" Hasn't taken any pain medications.   States when she isn't moving, she doesn't feel the pain at all, pain is only when she moves. Home care advice given. Protocol and care advice reviewed.  Caller states understanding of the recommended disposition.  Advised to be seen in the ER if thigh pain is present, constant and present >1 hour. Otherwise advised to call back if:  * Moderate pain (e.g., limping) lasts over 3 days   * Mild pain lasts over 7 days   * Signs of infection occur (e.g., spreading redness, warmth, fever)   * You become worse.    Di Arevalo RN/[facility]        COVID 19 Nurse Triage Plan/Patient Instructions    Please be aware that novel coronavirus (COVID-19) may be circulating in the community. If you develop symptoms such as fever, cough, or SOB or if you have concerns about the presence of another infection including coronavirus (COVID-19), please contact your health care provider or visit [email or web address].     Disposition/Instructions    Home care recommended. Follow home care protocol based instructions.    Thank you for taking steps to prevent the spread of this virus.  o Limit your contact with others.  o Wear a simple mask to cover your cough.  o Wash your hands well and often.    Resources    [facility]: About COVID-19: [email or web address]    CDC: What to Do If You're Sick: www.cdc.gov/coronavirus/2019-ncov/about/steps-when-sick.html    CDC: Ending Home Isolation: www.cdc.gov/coronavirus/2019-ncov/hcp/disposition-in-home-patients.html     CDC: Caring for Someone: " "www.cdc.gov/coronavirus/2019-ncov/if-you-are-sick/care-for-someone.html     [facility]: Interim Guidance for Hospital Discharge to Home: www.health.[email or web address]/diseases/coronavirus/hcp/hospdischarge.pdf    [facility] clinical trials (COVID-19 research studies): [email or web address]     Below are the COVID-19 hotlines at the [place] [facility] ([facility]). Interpreters are available.   o For health questions: Call 645-186-8825 or 1-990.419.4964 (7 a.m. to 7 p.m.)  o For questions about schools and childcare: Call 335-262-4714 or 1-322.892.1116 (7 a.m. to 7 p.m.)     Reason for Disposition    Leg pain    Additional Information    Negative: Followed a leg injury    Negative: Leg swelling is main symptom    Negative: Sounds like a life-threatening emergency to the triager    Negative: Looks like a broken bone or dislocated joint (e.g., crooked or deformed)    Negative: Chest pain    Negative: Difficulty breathing    Negative: Unable to walk    Negative: [1] Swollen joint AND [2] fever    Negative: [1] Red area or streak AND [2] fever    Negative: Patient sounds very sick or weak to the triager    Negative: Entire foot is cool or blue in comparison to other side    Negative: [1] Cast on leg or ankle AND [2] now increased pain    Negative: [1] SEVERE pain (e.g., excruciating, unable to do any normal activities) AND [2] not improved after 2 hours of pain medicine    Negative: [1] Thigh or calf pain AND [2] only 1 side AND [3] present > 1 hour    Negative: [1] Thigh, calf, or ankle swelling AND [2] only 1 side    Negative: [1] Thigh, calf, or ankle swelling AND [2] bilateral AND [3] 1 side is more swollen    Negative: [1] Red area or streak AND [2] large (> 2 in. or 5 cm)    Negative: History of prior \"blood clot\" in leg or lungs (i.e., deep vein thrombosis, pulmonary embolism)    Negative: Illness requiring prolonged bedrest in past month (e.g., immobilization, long hospital stay)    " "Negative: Major surgery in the past month    Negative: Hip or leg fracture (broken bone) in past month (or had cast on leg or ankle in past month)    Negative: Cancer treatment in the past two months (or has cancer now)    Negative: History of inherited increased risk of blood clots (e.g., Factor 5 Leiden, Anti-thrombin 3, Protein C or Protein S deficiency, Prothrombin mutation)    Negative: Long-distance travel in past month (e.g., car, bus, train, plane; with trip lasting 6 or more hours)    Negative: Numbness in a leg or foot (i.e., loss of sensation)    Negative: [1] Painful rash AND [2] multiple small blisters grouped together (i.e., dermatomal distribution or \"band\" or \"stripe\")    Negative: [1] Localized rash is very painful AND [2] no fever    Negative: Localized pain, redness or hard lump along vein    Negative: [1] Leg pain which occurs after walking a certain distance AND [2] disappears with rest AND [3] age > 50    Negative: [1] Swollen joint AND [2] no fever or redness    Negative: [1] MODERATE pain (e.g., interferes with normal activities, limping) AND [2] present > 3 days    Negative: [1] MILD pain (e.g., does not interfere with normal activities) AND [2] present > 7 days    Negative: Leg pain or muscle cramp is a chronic symptom (recurrent or ongoing AND present > 4 weeks)    Negative: Caused by overuse from recent vigorous activity (e.g.,  aerobics, jogging/running, physical work, prolonged walking, sports)    Negative: Looks like a boil, infected sore, deep ulcer or other infected rash (spreading redness, pus)    Negative: [1] Pain in front of the lower leg(s) (shins)     AND [2] occurs with running or jumping exercise  (e.g., jogging,  basketball)    Negative: Caused by strained muscle    Negative: [1] Caused by muscle cramps in the thigh, calf, or foot AND [2] present < 1 hour (brief, now gone)    Negative: Caused by previously diagnosed varicose veins (same pain, worsened by prolonged standing, " bulging veins in legs with worm-like appearance)    Negative: Caused by phantom leg pain    Protocols used: LEG PAIN-A-AH